# Patient Record
Sex: FEMALE | Race: WHITE | URBAN - METROPOLITAN AREA
[De-identification: names, ages, dates, MRNs, and addresses within clinical notes are randomized per-mention and may not be internally consistent; named-entity substitution may affect disease eponyms.]

---

## 2017-07-20 ENCOUNTER — EMERGENCY (EMERGENCY)
Facility: HOSPITAL | Age: 42
LOS: 1 days | Discharge: HOME | End: 2017-07-20
Attending: EMERGENCY MEDICINE | Admitting: EMERGENCY MEDICINE
Payer: COMMERCIAL

## 2017-07-20 VITALS
TEMPERATURE: 98 F | SYSTOLIC BLOOD PRESSURE: 112 MMHG | OXYGEN SATURATION: 97 % | HEART RATE: 74 BPM | DIASTOLIC BLOOD PRESSURE: 70 MMHG | RESPIRATION RATE: 17 BRPM

## 2017-07-20 DIAGNOSIS — R51 HEADACHE: ICD-10-CM

## 2017-07-20 DIAGNOSIS — Z88.0 ALLERGY STATUS TO PENICILLIN: ICD-10-CM

## 2017-07-20 DIAGNOSIS — Z88.8 ALLERGY STATUS TO OTHER DRUGS, MEDICAMENTS AND BIOLOGICAL SUBSTANCES STATUS: ICD-10-CM

## 2017-07-20 PROCEDURE — 99284 EMERGENCY DEPT VISIT MOD MDM: CPT

## 2017-07-20 PROCEDURE — 70450 CT HEAD/BRAIN W/O DYE: CPT | Mod: 26

## 2017-07-20 RX ORDER — IBUPROFEN 200 MG
600 TABLET ORAL ONCE
Qty: 0 | Refills: 0 | Status: COMPLETED | OUTPATIENT
Start: 2017-07-20 | End: 2017-07-20

## 2017-07-20 RX ORDER — METOCLOPRAMIDE HCL 10 MG
10 TABLET ORAL ONCE
Qty: 0 | Refills: 0 | Status: COMPLETED | OUTPATIENT
Start: 2017-07-20 | End: 2017-07-20

## 2017-07-20 RX ORDER — KETOROLAC TROMETHAMINE 30 MG/ML
15 SYRINGE (ML) INJECTION ONCE
Qty: 0 | Refills: 0 | Status: DISCONTINUED | OUTPATIENT
Start: 2017-07-20 | End: 2017-07-20

## 2017-07-20 RX ORDER — METOCLOPRAMIDE HCL 10 MG
10 TABLET ORAL ONCE
Qty: 0 | Refills: 0 | Status: DISCONTINUED | OUTPATIENT
Start: 2017-07-20 | End: 2017-07-20

## 2017-07-20 RX ORDER — SODIUM CHLORIDE 9 MG/ML
3 INJECTION INTRAMUSCULAR; INTRAVENOUS; SUBCUTANEOUS EVERY 8 HOURS
Qty: 0 | Refills: 0 | Status: DISCONTINUED | OUTPATIENT
Start: 2017-07-20 | End: 2017-07-20

## 2017-07-20 RX ADMIN — Medication 10 MILLIGRAM(S): at 21:50

## 2017-07-20 RX ADMIN — Medication 600 MILLIGRAM(S): at 21:50

## 2017-07-20 NOTE — ED PROVIDER NOTE - NEUROLOGICAL, MLM
Alert and oriented, no focal deficits, no motor or sensory deficits.  CN 2-12 intact.  Motor strength 5/5 bilaterally.  DTRs 2+ bilaterally.  Normal gait, negative Romberg.

## 2017-07-20 NOTE — ED PROVIDER NOTE - OBJECTIVE STATEMENT
41 yo female presents with c/o headache which began about 5pm.  She was reading at the time.  Pain has gotten progressively worse over time.  She did not have any visual changes at onset, but vision is intermittently blurry.  She felt nausea, but did not vomit.  Pt did not take any OTC medications for headache.  Her mother noted something "jumping" in the right side of the neck, which concerned them, so they came to the ED.    Pt denies any history of frequent headaches.  Had a massage today, but there was no neck manipulation.

## 2017-07-20 NOTE — ED PROVIDER NOTE - PROGRESS NOTE DETAILS
Pt was given PO meds, but vomited.  IV meds ordered.  However, pt states that her headache is starting to improve. Headache improved

## 2017-07-20 NOTE — ED PROVIDER NOTE - ENMT, MLM
Airway patent, Nasal mucosa clear. Mouth with normal mucosa. Throat has no vesicles, no oropharyngeal exudates and uvula is midline. Airway patent, Nasal mucosa clear. Mouth with normal mucosa. Throat has no vesicles, no oropharyngeal exudates and uvula is midline.  Neck has full and painless ROM with no bony or soft tissue tenderness.

## 2017-07-20 NOTE — ED ADULT NURSE NOTE - OBJECTIVE STATEMENT
Pt came in c/o right frontal headache that started today 1700. Pt reports being at dinner with her mother when he mother told her it "looked like a carotid artery was more pronounced and jumpy". Pt denies any neck pain, no palpitations or CP, no SOB. Pt is A&Ox3, neuro/sensory intact.

## 2017-07-20 NOTE — ED ADULT NURSE NOTE - CHPI ED SYMPTOMS NEG
no confusion/no weakness/no vomiting/no nausea/no numbness/no loss of consciousness/no blurred vision/no fever/no dizziness/no change in level of consciousness

## 2018-04-09 NOTE — ED ADULT NURSE NOTE - FALL HARM RISK CONCLUSION
Universal Safety Interventions
I personally performed the service described in the documentation recorded by the scribe in my presence, and it accurately and completely records my words and actions.

## 2021-09-10 NOTE — ED ADULT NURSE NOTE - NEURO WDL
no anemia, no easy bruising, no jaundice, no swollen lymph nodes.
Alert and oriented to person, place and time, memory intact, behavior appropriate to situation, PERRL.

## 2022-04-14 NOTE — ED PROVIDER NOTE - X-RAY INTERPRETATION
Patient reports continuing to feel \"quite uncomfortable and bloated.\"  Nichole notified.  
radiologist
